# Patient Record
Sex: FEMALE | Race: WHITE | NOT HISPANIC OR LATINO | Employment: OTHER | URBAN - METROPOLITAN AREA
[De-identification: names, ages, dates, MRNs, and addresses within clinical notes are randomized per-mention and may not be internally consistent; named-entity substitution may affect disease eponyms.]

---

## 2017-06-26 ENCOUNTER — HISTORICAL (OUTPATIENT)
Dept: ADMINISTRATIVE | Facility: HOSPITAL | Age: 62
End: 2017-06-26

## 2017-06-26 LAB
ABS NEUT (OLG): 5.19 X10(3)/MCL (ref 2.1–9.2)
ALBUMIN SERPL-MCNC: 3.9 GM/DL (ref 3.4–5)
ALBUMIN/GLOB SERPL: 1.3 {RATIO}
ALP SERPL-CCNC: 63 UNIT/L (ref 38–126)
ALT SERPL-CCNC: 20 UNIT/L (ref 12–78)
AST SERPL-CCNC: 11 UNIT/L (ref 15–37)
BASOPHILS # BLD AUTO: 0 X10(3)/MCL (ref 0–0.2)
BASOPHILS NFR BLD AUTO: 0 %
BILIRUB SERPL-MCNC: 0.5 MG/DL (ref 0.2–1)
BILIRUBIN DIRECT+TOT PNL SERPL-MCNC: 0.1 MG/DL (ref 0–0.2)
BILIRUBIN DIRECT+TOT PNL SERPL-MCNC: 0.4 MG/DL (ref 0–0.8)
BUN SERPL-MCNC: 27 MG/DL (ref 7–18)
CALCIUM SERPL-MCNC: 8.7 MG/DL (ref 8.5–10.1)
CHLORIDE SERPL-SCNC: 105 MMOL/L (ref 98–107)
CO2 SERPL-SCNC: 28 MMOL/L (ref 21–32)
CREAT SERPL-MCNC: 1.06 MG/DL (ref 0.55–1.02)
CRP SERPL HS-MCNC: 1.31 MG/L (ref 0–3)
EOSINOPHIL # BLD AUTO: 0.1 X10(3)/MCL (ref 0–0.9)
EOSINOPHIL NFR BLD AUTO: 2 %
ERYTHROCYTE [DISTWIDTH] IN BLOOD BY AUTOMATED COUNT: 13.7 % (ref 11.5–17)
GLOBULIN SER-MCNC: 3 GM/DL (ref 2.4–3.5)
GLUCOSE SERPL-MCNC: 106 MG/DL (ref 74–106)
HCT VFR BLD AUTO: 34.4 % (ref 37–47)
HGB BLD-MCNC: 10.7 GM/DL (ref 12–16)
LYMPHOCYTES # BLD AUTO: 2.1 X10(3)/MCL (ref 0.6–4.6)
LYMPHOCYTES NFR BLD AUTO: 26 %
MCH RBC QN AUTO: 28.7 PG (ref 27–31)
MCHC RBC AUTO-ENTMCNC: 31.1 GM/DL (ref 33–36)
MCV RBC AUTO: 92.2 FL (ref 80–94)
MONOCYTES # BLD AUTO: 0.6 X10(3)/MCL (ref 0.1–1.3)
MONOCYTES NFR BLD AUTO: 7 %
NEUTROPHILS # BLD AUTO: 5.19 X10(3)/MCL (ref 1.4–7.9)
NEUTROPHILS NFR BLD AUTO: 65 %
PLATELET # BLD AUTO: 242 X10(3)/MCL (ref 130–400)
PMV BLD AUTO: 10.6 FL (ref 9.4–12.4)
POTASSIUM SERPL-SCNC: 4.1 MMOL/L (ref 3.5–5.1)
PROT SERPL-MCNC: 6.9 GM/DL (ref 6.4–8.2)
RBC # BLD AUTO: 3.73 X10(6)/MCL (ref 4.2–5.4)
SODIUM SERPL-SCNC: 141 MMOL/L (ref 136–145)
WBC # SPEC AUTO: 8 X10(3)/MCL (ref 4.5–11.5)

## 2017-06-27 ENCOUNTER — HISTORICAL (OUTPATIENT)
Dept: ADMINISTRATIVE | Facility: HOSPITAL | Age: 62
End: 2017-06-27

## 2017-07-06 ENCOUNTER — HISTORICAL (OUTPATIENT)
Dept: ADMINISTRATIVE | Facility: HOSPITAL | Age: 62
End: 2017-07-06

## 2018-06-25 ENCOUNTER — HISTORICAL (OUTPATIENT)
Dept: ADMINISTRATIVE | Facility: HOSPITAL | Age: 63
End: 2018-06-25

## 2018-06-25 ENCOUNTER — HISTORICAL (OUTPATIENT)
Dept: LAB | Facility: HOSPITAL | Age: 63
End: 2018-06-25

## 2018-06-25 ENCOUNTER — HISTORICAL (OUTPATIENT)
Dept: BARIATRICS | Facility: HOSPITAL | Age: 63
End: 2018-06-25

## 2018-06-25 LAB — H PYLORI AB SER IA-ACNC: NEGATIVE

## 2018-07-30 ENCOUNTER — HISTORICAL (OUTPATIENT)
Dept: BARIATRICS | Facility: HOSPITAL | Age: 63
End: 2018-07-30

## 2018-08-23 ENCOUNTER — HISTORICAL (OUTPATIENT)
Dept: BARIATRICS | Facility: HOSPITAL | Age: 63
End: 2018-08-23

## 2018-09-26 ENCOUNTER — HISTORICAL (OUTPATIENT)
Dept: BARIATRICS | Facility: HOSPITAL | Age: 63
End: 2018-09-26

## 2018-09-28 ENCOUNTER — HISTORICAL (OUTPATIENT)
Dept: SURGERY | Facility: HOSPITAL | Age: 63
End: 2018-09-28

## 2018-10-24 ENCOUNTER — HISTORICAL (OUTPATIENT)
Dept: BARIATRICS | Facility: HOSPITAL | Age: 63
End: 2018-10-24

## 2018-11-20 ENCOUNTER — HISTORICAL (OUTPATIENT)
Dept: BARIATRICS | Facility: HOSPITAL | Age: 63
End: 2018-11-20

## 2018-11-26 ENCOUNTER — HISTORICAL (OUTPATIENT)
Dept: PREADMISSION TESTING | Facility: HOSPITAL | Age: 63
End: 2018-11-26

## 2018-11-26 LAB
ABS NEUT (OLG): 5.87 X10(3)/MCL (ref 2.1–9.2)
ALBUMIN SERPL-MCNC: 3.8 GM/DL (ref 3.4–5)
ALBUMIN/GLOB SERPL: 1.2 {RATIO}
ALP SERPL-CCNC: 61 UNIT/L (ref 38–126)
ALT SERPL-CCNC: 21 UNIT/L (ref 12–78)
APTT PPP: 31.6 SECOND(S) (ref 24.8–36.9)
AST SERPL-CCNC: 11 UNIT/L (ref 15–37)
BASOPHILS # BLD AUTO: 0 X10(3)/MCL (ref 0–0.2)
BASOPHILS NFR BLD AUTO: 0 %
BILIRUB SERPL-MCNC: 0.8 MG/DL (ref 0.2–1)
BILIRUBIN DIRECT+TOT PNL SERPL-MCNC: 0.2 MG/DL (ref 0–0.2)
BILIRUBIN DIRECT+TOT PNL SERPL-MCNC: 0.6 MG/DL (ref 0–0.8)
BUN SERPL-MCNC: 27 MG/DL (ref 7–18)
CALCIUM SERPL-MCNC: 9 MG/DL (ref 8.5–10.1)
CHLORIDE SERPL-SCNC: 104 MMOL/L (ref 98–107)
CO2 SERPL-SCNC: 29 MMOL/L (ref 21–32)
CREAT SERPL-MCNC: 0.96 MG/DL (ref 0.55–1.02)
EOSINOPHIL # BLD AUTO: 0.2 X10(3)/MCL (ref 0–0.9)
EOSINOPHIL NFR BLD AUTO: 2 %
ERYTHROCYTE [DISTWIDTH] IN BLOOD BY AUTOMATED COUNT: 13.1 % (ref 11.5–17)
GLOBULIN SER-MCNC: 3.2 GM/DL (ref 2.4–3.5)
GLUCOSE SERPL-MCNC: 98 MG/DL (ref 74–106)
HCT VFR BLD AUTO: 37.7 % (ref 37–47)
HGB BLD-MCNC: 11.9 GM/DL (ref 12–16)
LYMPHOCYTES # BLD AUTO: 1.9 X10(3)/MCL (ref 0.6–4.6)
LYMPHOCYTES NFR BLD AUTO: 22 %
MCH RBC QN AUTO: 29.6 PG (ref 27–31)
MCHC RBC AUTO-ENTMCNC: 31.6 GM/DL (ref 33–36)
MCV RBC AUTO: 93.8 FL (ref 80–94)
MONOCYTES # BLD AUTO: 0.5 X10(3)/MCL (ref 0.1–1.3)
MONOCYTES NFR BLD AUTO: 6 %
NEUTROPHILS # BLD AUTO: 5.87 X10(3)/MCL (ref 2.1–9.2)
NEUTROPHILS NFR BLD AUTO: 69 %
PLATELET # BLD AUTO: 254 X10(3)/MCL (ref 130–400)
PMV BLD AUTO: 10.4 FL (ref 9.4–12.4)
POTASSIUM SERPL-SCNC: 4.5 MMOL/L (ref 3.5–5.1)
PROT SERPL-MCNC: 7 GM/DL (ref 6.4–8.2)
RBC # BLD AUTO: 4.02 X10(6)/MCL (ref 4.2–5.4)
SODIUM SERPL-SCNC: 140 MMOL/L (ref 136–145)
WBC # SPEC AUTO: 8.5 X10(3)/MCL (ref 4.5–11.5)

## 2018-11-30 ENCOUNTER — HISTORICAL (OUTPATIENT)
Dept: BARIATRICS | Facility: HOSPITAL | Age: 63
End: 2018-11-30

## 2018-12-14 ENCOUNTER — HISTORICAL (OUTPATIENT)
Dept: LAB | Facility: HOSPITAL | Age: 63
End: 2018-12-14

## 2018-12-26 ENCOUNTER — HISTORICAL (OUTPATIENT)
Dept: BARIATRICS | Facility: HOSPITAL | Age: 63
End: 2018-12-26

## 2019-02-11 ENCOUNTER — HISTORICAL (OUTPATIENT)
Dept: PREADMISSION TESTING | Facility: HOSPITAL | Age: 64
End: 2019-02-11

## 2019-02-11 LAB
ABS NEUT (OLG): 3.69 X10(3)/MCL (ref 2.1–9.2)
ALBUMIN SERPL-MCNC: 3.9 GM/DL (ref 3.4–5)
ALBUMIN/GLOB SERPL: 1.1 RATIO (ref 1.1–2)
ALP SERPL-CCNC: 55 UNIT/L (ref 38–126)
ALT SERPL-CCNC: 23 UNIT/L (ref 12–78)
AST SERPL-CCNC: 15 UNIT/L (ref 15–37)
BASOPHILS # BLD AUTO: 0 X10(3)/MCL (ref 0–0.2)
BASOPHILS NFR BLD AUTO: 0 %
BILIRUB SERPL-MCNC: 0.7 MG/DL (ref 0.2–1)
BILIRUBIN DIRECT+TOT PNL SERPL-MCNC: 0.2 MG/DL (ref 0–0.5)
BILIRUBIN DIRECT+TOT PNL SERPL-MCNC: 0.5 MG/DL (ref 0–0.8)
BUN SERPL-MCNC: 35 MG/DL (ref 7–18)
CALCIUM SERPL-MCNC: 9 MG/DL (ref 8.5–10.1)
CHLORIDE SERPL-SCNC: 104 MMOL/L (ref 98–107)
CO2 SERPL-SCNC: 27 MMOL/L (ref 21–32)
CREAT SERPL-MCNC: 1.13 MG/DL (ref 0.55–1.02)
EOSINOPHIL # BLD AUTO: 0.1 X10(3)/MCL (ref 0–0.9)
EOSINOPHIL NFR BLD AUTO: 2 %
ERYTHROCYTE [DISTWIDTH] IN BLOOD BY AUTOMATED COUNT: 15.6 % (ref 11.5–17)
EST. AVERAGE GLUCOSE BLD GHB EST-MCNC: 94 MG/DL
GLOBULIN SER-MCNC: 3.4 GM/DL (ref 2.4–3.5)
GLUCOSE SERPL-MCNC: 80 MG/DL (ref 74–106)
HBA1C MFR BLD: 4.9 % (ref 4.2–6.3)
HCT VFR BLD AUTO: 42.3 % (ref 37–47)
HGB BLD-MCNC: 13 GM/DL (ref 12–16)
IRON SATN MFR SERPL: 24.3 % (ref 20–50)
IRON SERPL-MCNC: 69 MCG/DL (ref 50–175)
LYMPHOCYTES # BLD AUTO: 1.9 X10(3)/MCL (ref 0.6–4.6)
LYMPHOCYTES NFR BLD AUTO: 30 %
MCH RBC QN AUTO: 29.1 PG (ref 27–31)
MCHC RBC AUTO-ENTMCNC: 30.7 GM/DL (ref 33–36)
MCV RBC AUTO: 94.6 FL (ref 80–94)
MONOCYTES # BLD AUTO: 0.5 X10(3)/MCL (ref 0.1–1.3)
MONOCYTES NFR BLD AUTO: 8 %
NEUTROPHILS # BLD AUTO: 3.69 X10(3)/MCL (ref 2.1–9.2)
NEUTROPHILS NFR BLD AUTO: 59 %
PLATELET # BLD AUTO: 190 X10(3)/MCL (ref 130–400)
PMV BLD AUTO: 11.4 FL (ref 9.4–12.4)
POTASSIUM SERPL-SCNC: 4.2 MMOL/L (ref 3.5–5.1)
PROT SERPL-MCNC: 7.3 GM/DL (ref 6.4–8.2)
RBC # BLD AUTO: 4.47 X10(6)/MCL (ref 4.2–5.4)
SODIUM SERPL-SCNC: 141 MMOL/L (ref 136–145)
TIBC SERPL-MCNC: 284 MCG/DL (ref 250–450)
TRANSFERRIN SERPL-MCNC: 198 MG/DL (ref 200–360)
VIT B12 SERPL-MCNC: 865 PG/ML (ref 193–986)
WBC # SPEC AUTO: 6.3 X10(3)/MCL (ref 4.5–11.5)

## 2019-02-18 ENCOUNTER — HISTORICAL (OUTPATIENT)
Dept: BARIATRICS | Facility: HOSPITAL | Age: 64
End: 2019-02-18

## 2019-04-15 ENCOUNTER — HISTORICAL (OUTPATIENT)
Dept: BARIATRICS | Facility: HOSPITAL | Age: 64
End: 2019-04-15

## 2019-05-29 ENCOUNTER — HISTORICAL (OUTPATIENT)
Dept: PREADMISSION TESTING | Facility: HOSPITAL | Age: 64
End: 2019-05-29

## 2019-05-29 LAB
ABS NEUT (OLG): 4.33 X10(3)/MCL (ref 2.1–9.2)
ALBUMIN SERPL-MCNC: 4.2 GM/DL (ref 3.4–5)
ALBUMIN/GLOB SERPL: 1.4 RATIO (ref 1.1–2)
ALP SERPL-CCNC: 50 UNIT/L (ref 38–126)
ALT SERPL-CCNC: 19 UNIT/L (ref 12–78)
AST SERPL-CCNC: 14 UNIT/L (ref 15–37)
BASOPHILS # BLD AUTO: 0 X10(3)/MCL (ref 0–0.2)
BASOPHILS NFR BLD AUTO: 0 %
BILIRUB SERPL-MCNC: 0.9 MG/DL (ref 0.2–1)
BILIRUBIN DIRECT+TOT PNL SERPL-MCNC: 0.3 MG/DL (ref 0–0.5)
BILIRUBIN DIRECT+TOT PNL SERPL-MCNC: 0.6 MG/DL (ref 0–0.8)
BUN SERPL-MCNC: 33 MG/DL (ref 7–18)
CALCIUM SERPL-MCNC: 9.7 MG/DL (ref 8.5–10.1)
CHLORIDE SERPL-SCNC: 104 MMOL/L (ref 98–107)
CO2 SERPL-SCNC: 27 MMOL/L (ref 21–32)
CREAT SERPL-MCNC: 1.25 MG/DL (ref 0.55–1.02)
EOSINOPHIL # BLD AUTO: 0.1 X10(3)/MCL (ref 0–0.9)
EOSINOPHIL NFR BLD AUTO: 2 %
ERYTHROCYTE [DISTWIDTH] IN BLOOD BY AUTOMATED COUNT: 13.6 % (ref 11.5–17)
EST. AVERAGE GLUCOSE BLD GHB EST-MCNC: 94 MG/DL
GLOBULIN SER-MCNC: 3 GM/DL (ref 2.4–3.5)
GLUCOSE SERPL-MCNC: 90 MG/DL (ref 74–106)
HBA1C MFR BLD: 4.9 % (ref 4.2–6.3)
HCT VFR BLD AUTO: 37.5 % (ref 37–47)
HGB BLD-MCNC: 12 GM/DL (ref 12–16)
IRON SATN MFR SERPL: 23.8 % (ref 20–50)
IRON SERPL-MCNC: 70 MCG/DL (ref 50–175)
LYMPHOCYTES # BLD AUTO: 2.1 X10(3)/MCL (ref 0.6–4.6)
LYMPHOCYTES NFR BLD AUTO: 30 %
MCH RBC QN AUTO: 30.8 PG (ref 27–31)
MCHC RBC AUTO-ENTMCNC: 32 GM/DL (ref 33–36)
MCV RBC AUTO: 96.4 FL (ref 80–94)
MONOCYTES # BLD AUTO: 0.5 X10(3)/MCL (ref 0.1–1.3)
MONOCYTES NFR BLD AUTO: 6 %
NEUTROPHILS # BLD AUTO: 4.33 X10(3)/MCL (ref 2.1–9.2)
NEUTROPHILS NFR BLD AUTO: 61 %
PLATELET # BLD AUTO: 212 X10(3)/MCL (ref 130–400)
PMV BLD AUTO: 11.8 FL (ref 9.4–12.4)
POTASSIUM SERPL-SCNC: 4.2 MMOL/L (ref 3.5–5.1)
PROT SERPL-MCNC: 7.2 GM/DL (ref 6.4–8.2)
RBC # BLD AUTO: 3.89 X10(6)/MCL (ref 4.2–5.4)
SODIUM SERPL-SCNC: 140 MMOL/L (ref 136–145)
TIBC SERPL-MCNC: 294 MCG/DL (ref 250–450)
TRANSFERRIN SERPL-MCNC: 228 MG/DL (ref 200–360)
VIT B12 SERPL-MCNC: 2051 PG/ML (ref 193–986)
WBC # SPEC AUTO: 7.1 X10(3)/MCL (ref 4.5–11.5)

## 2019-06-17 ENCOUNTER — HISTORICAL (OUTPATIENT)
Dept: BARIATRICS | Facility: HOSPITAL | Age: 64
End: 2019-06-17

## 2019-09-13 ENCOUNTER — HISTORICAL (OUTPATIENT)
Dept: BARIATRICS | Facility: HOSPITAL | Age: 64
End: 2019-09-13

## 2019-12-13 ENCOUNTER — HISTORICAL (OUTPATIENT)
Dept: PREADMISSION TESTING | Facility: HOSPITAL | Age: 64
End: 2019-12-13

## 2019-12-13 LAB
ABS NEUT (OLG): 4.74 X10(3)/MCL (ref 2.1–9.2)
ALBUMIN SERPL-MCNC: 3.8 GM/DL (ref 3.4–5)
ALBUMIN/GLOB SERPL: 1.2 RATIO (ref 1.1–2)
ALP SERPL-CCNC: 72 UNIT/L (ref 38–126)
ALT SERPL-CCNC: 34 UNIT/L (ref 12–78)
AST SERPL-CCNC: 19 UNIT/L (ref 15–37)
BASOPHILS # BLD AUTO: 0 X10(3)/MCL (ref 0–0.2)
BASOPHILS NFR BLD AUTO: 0 %
BILIRUB SERPL-MCNC: 1.4 MG/DL (ref 0.2–1)
BILIRUBIN DIRECT+TOT PNL SERPL-MCNC: 0.3 MG/DL (ref 0–0.5)
BILIRUBIN DIRECT+TOT PNL SERPL-MCNC: 1.1 MG/DL (ref 0–0.8)
BUN SERPL-MCNC: 24 MG/DL (ref 7–18)
CALCIUM SERPL-MCNC: 9.4 MG/DL (ref 8.5–10.1)
CHLORIDE SERPL-SCNC: 105 MMOL/L (ref 98–107)
CO2 SERPL-SCNC: 28 MMOL/L (ref 21–32)
CREAT SERPL-MCNC: 0.78 MG/DL (ref 0.55–1.02)
EOSINOPHIL # BLD AUTO: 0.1 X10(3)/MCL (ref 0–0.9)
EOSINOPHIL NFR BLD AUTO: 2 %
ERYTHROCYTE [DISTWIDTH] IN BLOOD BY AUTOMATED COUNT: 13.1 % (ref 11.5–17)
EST. AVERAGE GLUCOSE BLD GHB EST-MCNC: 120 MG/DL
GLOBULIN SER-MCNC: 3.2 GM/DL (ref 2.4–3.5)
GLUCOSE SERPL-MCNC: 80 MG/DL (ref 74–106)
HBA1C MFR BLD: 5.8 % (ref 4.2–6.3)
HCT VFR BLD AUTO: 42.3 % (ref 37–47)
HGB BLD-MCNC: 13.2 GM/DL (ref 12–16)
IRON SATN MFR SERPL: 31.8 % (ref 20–50)
IRON SERPL-MCNC: 100 MCG/DL (ref 50–175)
LYMPHOCYTES # BLD AUTO: 1.9 X10(3)/MCL (ref 0.6–4.6)
LYMPHOCYTES NFR BLD AUTO: 26 %
MCH RBC QN AUTO: 30.1 PG (ref 27–31)
MCHC RBC AUTO-ENTMCNC: 31.2 GM/DL (ref 33–36)
MCV RBC AUTO: 96.4 FL (ref 80–94)
MONOCYTES # BLD AUTO: 0.5 X10(3)/MCL (ref 0.1–1.3)
MONOCYTES NFR BLD AUTO: 7 %
NEUTROPHILS # BLD AUTO: 4.74 X10(3)/MCL (ref 2.1–9.2)
NEUTROPHILS NFR BLD AUTO: 64 %
PLATELET # BLD AUTO: 244 X10(3)/MCL (ref 130–400)
PMV BLD AUTO: 11.6 FL (ref 9.4–12.4)
POTASSIUM SERPL-SCNC: 4.6 MMOL/L (ref 3.5–5.1)
PROT SERPL-MCNC: 7 GM/DL (ref 6.4–8.2)
RBC # BLD AUTO: 4.39 X10(6)/MCL (ref 4.2–5.4)
SODIUM SERPL-SCNC: 139 MMOL/L (ref 136–145)
TIBC SERPL-MCNC: 314 MCG/DL (ref 250–450)
TRANSFERRIN SERPL-MCNC: 254 MG/DL (ref 200–360)
VIT B12 SERPL-MCNC: 743 PG/ML (ref 193–986)
WBC # SPEC AUTO: 7.4 X10(3)/MCL (ref 4.5–11.5)

## 2019-12-20 ENCOUNTER — HISTORICAL (OUTPATIENT)
Dept: BARIATRICS | Facility: HOSPITAL | Age: 64
End: 2019-12-20

## 2020-06-01 ENCOUNTER — HISTORICAL (OUTPATIENT)
Dept: PREADMISSION TESTING | Facility: HOSPITAL | Age: 65
End: 2020-06-01

## 2020-06-01 LAB
ABS NEUT (OLG): 3.62 X10(3)/MCL (ref 2.1–9.2)
ALBUMIN SERPL-MCNC: 4 GM/DL (ref 3.4–5)
ALBUMIN/GLOB SERPL: 1.3 RATIO (ref 1.1–2)
ALP SERPL-CCNC: 87 UNIT/L (ref 40–150)
ALT SERPL-CCNC: 21 UNIT/L (ref 0–55)
ANTINUCLEAR ANTIBODY SCREEN (OHS): NEGATIVE
APPEARANCE, UA: ABNORMAL
AST SERPL-CCNC: 23 UNIT/L (ref 5–34)
BACTERIA SPEC CULT: ABNORMAL /HPF
BASOPHILS # BLD AUTO: 0 X10(3)/MCL (ref 0–0.2)
BASOPHILS NFR BLD AUTO: 0 %
BILIRUB SERPL-MCNC: 0.8 MG/DL
BILIRUB UR QL STRIP: NEGATIVE
BILIRUBIN DIRECT+TOT PNL SERPL-MCNC: 0.3 MG/DL (ref 0–0.5)
BILIRUBIN DIRECT+TOT PNL SERPL-MCNC: 0.5 MG/DL (ref 0–0.8)
BUN SERPL-MCNC: 17.7 MG/DL (ref 9.8–20.1)
CALCIUM SERPL-MCNC: 9.3 MG/DL (ref 8.4–10.2)
CHLORIDE SERPL-SCNC: 103 MMOL/L (ref 98–107)
CO2 SERPL-SCNC: 31 MMOL/L (ref 23–31)
COLOR UR: YELLOW
CREAT SERPL-MCNC: 0.89 MG/DL (ref 0.55–1.02)
DSDNA ANTIBODY (OHS): NEGATIVE
EOSINOPHIL # BLD AUTO: 0.3 X10(3)/MCL (ref 0–0.9)
EOSINOPHIL NFR BLD AUTO: 5 %
ERYTHROCYTE [DISTWIDTH] IN BLOOD BY AUTOMATED COUNT: 12.9 % (ref 11.5–17)
GLOBULIN SER-MCNC: 3 GM/DL (ref 2.4–3.5)
GLUCOSE (UA): NEGATIVE
GLUCOSE SERPL-MCNC: 101 MG/DL (ref 82–115)
HCT VFR BLD AUTO: 43.3 % (ref 37–47)
HCV AB SERPL QL IA: NONREACTIVE
HGB BLD-MCNC: 13.9 GM/DL (ref 12–16)
HGB UR QL STRIP: ABNORMAL
HIV 1+2 AB+HIV1 P24 AG SERPL QL IA: NONREACTIVE
IRON SATN MFR SERPL: 29 % (ref 20–50)
IRON SERPL-MCNC: 86 UG/DL (ref 50–170)
KETONES UR QL STRIP: NEGATIVE
LEUKOCYTE ESTERASE UR QL STRIP: ABNORMAL
LYMPHOCYTES # BLD AUTO: 1.9 X10(3)/MCL (ref 0.6–4.6)
LYMPHOCYTES NFR BLD AUTO: 30 %
MCH RBC QN AUTO: 30.2 PG (ref 27–31)
MCHC RBC AUTO-ENTMCNC: 32.1 GM/DL (ref 33–36)
MCV RBC AUTO: 93.9 FL (ref 80–94)
MONOCYTES # BLD AUTO: 0.4 X10(3)/MCL (ref 0.1–1.3)
MONOCYTES NFR BLD AUTO: 7 %
NEUTROPHILS # BLD AUTO: 3.62 X10(3)/MCL (ref 2.1–9.2)
NEUTROPHILS NFR BLD AUTO: 57 %
NITRITE UR QL STRIP: NEGATIVE
PH UR STRIP: 5 [PH] (ref 5–9)
PLATELET # BLD AUTO: 252 X10(3)/MCL (ref 130–400)
PMV BLD AUTO: 10.9 FL (ref 9.4–12.4)
POTASSIUM SERPL-SCNC: 4.6 MMOL/L (ref 3.5–5.1)
PROT SERPL-MCNC: 7 GM/DL
PROT SERPL-MCNC: 7 GM/DL (ref 5.8–7.6)
PROT UR QL STRIP: NEGATIVE
RBC # BLD AUTO: 4.61 X10(6)/MCL (ref 4.2–5.4)
RBC #/AREA URNS HPF: ABNORMAL /[HPF]
SODIUM SERPL-SCNC: 143 MMOL/L (ref 136–145)
SP GR UR STRIP: 1.02 (ref 1–1.03)
SQUAMOUS EPITHELIAL, UA: ABNORMAL
TIBC SERPL-MCNC: 210 UG/DL (ref 70–310)
TIBC SERPL-MCNC: 296 UG/DL (ref 250–450)
TRANSFERRIN SERPL-MCNC: 251 MG/DL (ref 173–360)
TSH SERPL-ACNC: 1.43 UIU/ML (ref 0.35–4.94)
UROBILINOGEN UR STRIP-ACNC: 0.2
VIT B12 SERPL-MCNC: 1135 PG/ML (ref 213–816)
WBC # SPEC AUTO: 6.4 X10(3)/MCL (ref 4.5–11.5)
WBC #/AREA URNS HPF: 13 /HPF (ref 0–3)

## 2020-06-11 LAB
ALBUMIN SERPL-MCNC: 4.4 GM/DL (ref 3.4–4.8)
ALBUMIN/GLOB SERPL: 1.5 RATIO (ref 1.1–2)
ALP SERPL-CCNC: 83 UNIT/L (ref 40–150)
ALT SERPL-CCNC: 19 UNIT/L (ref 0–55)
AST SERPL-CCNC: 24 UNIT/L (ref 5–34)
BILIRUB SERPL-MCNC: 1.5 MG/DL
BILIRUBIN DIRECT+TOT PNL SERPL-MCNC: 0.5 MG/DL (ref 0–0.5)
BILIRUBIN DIRECT+TOT PNL SERPL-MCNC: 1 MG/DL (ref 0–0.8)
BUN SERPL-MCNC: 28 MG/DL (ref 9.8–20.1)
CALCIUM SERPL-MCNC: 9.5 MG/DL (ref 8.4–10.2)
CHLORIDE SERPL-SCNC: 103 MMOL/L (ref 98–107)
CHOLEST SERPL-MCNC: 223 MG/DL
CHOLEST/HDLC SERPL: 2 {RATIO} (ref 0–5)
CO2 SERPL-SCNC: 28 MMOL/L (ref 23–31)
CREAT SERPL-MCNC: 0.84 MG/DL (ref 0.55–1.02)
ERYTHROCYTE [DISTWIDTH] IN BLOOD BY AUTOMATED COUNT: 12.5 % (ref 11.5–17)
EST. AVERAGE GLUCOSE BLD GHB EST-MCNC: 114 MG/DL
GLOBULIN SER-MCNC: 3 GM/DL (ref 2.4–3.5)
GLUCOSE SERPL-MCNC: 106 MG/DL (ref 82–115)
HBA1C MFR BLD: 5.6 %
HCT VFR BLD AUTO: 41.8 % (ref 37–47)
HDLC SERPL-MCNC: 91 MG/DL (ref 35–60)
HGB BLD-MCNC: 13.7 GM/DL (ref 12–16)
INR PPP: 1 (ref 0–1.3)
LDLC SERPL CALC-MCNC: 120 MG/DL (ref 50–140)
MAGNESIUM SERPL-MCNC: 1.88 MG/DL (ref 1.6–2.6)
MCH RBC QN AUTO: 30.2 PG (ref 27–31)
MCHC RBC AUTO-ENTMCNC: 32.8 GM/DL (ref 33–36)
MCV RBC AUTO: 92.1 FL (ref 80–94)
PLATELET # BLD AUTO: 247 X10(3)/MCL (ref 130–400)
PMV BLD AUTO: 10.3 FL (ref 9.4–12.4)
POTASSIUM SERPL-SCNC: 4.2 MMOL/L (ref 3.5–5.1)
PROT SERPL-MCNC: 7.4 GM/DL (ref 5.8–7.6)
PROTHROMBIN TIME: 12.7 SECOND(S) (ref 11.1–13.7)
RBC # BLD AUTO: 4.54 X10(6)/MCL (ref 4.2–5.4)
SODIUM SERPL-SCNC: 140 MMOL/L (ref 136–145)
TRIGL SERPL-MCNC: 59 MG/DL (ref 37–140)
TSH SERPL-ACNC: 0.69 UIU/ML (ref 0.35–4.94)
VLDLC SERPL CALC-MCNC: 12 MG/DL
WBC # SPEC AUTO: 6.6 X10(3)/MCL (ref 4.5–11.5)

## 2020-06-12 ENCOUNTER — HISTORICAL (OUTPATIENT)
Dept: ADMINISTRATIVE | Facility: HOSPITAL | Age: 65
End: 2020-06-12

## 2020-09-21 ENCOUNTER — HISTORICAL (OUTPATIENT)
Dept: ADMINISTRATIVE | Facility: HOSPITAL | Age: 65
End: 2020-09-21

## 2020-09-21 LAB
BUN SERPL-MCNC: 28.2 MG/DL (ref 9.8–20.1)
CALCIUM SERPL-MCNC: 8.7 MG/DL (ref 8.4–10.2)
CHLORIDE SERPL-SCNC: 104 MMOL/L (ref 98–107)
CHOLEST SERPL-MCNC: 177 MG/DL
CHOLEST/HDLC SERPL: 3 {RATIO} (ref 0–5)
CO2 SERPL-SCNC: 30 MMOL/L (ref 23–31)
CREAT SERPL-MCNC: 0.86 MG/DL (ref 0.55–1.02)
CREAT/UREA NIT SERPL: 33
GLUCOSE SERPL-MCNC: 104 MG/DL (ref 82–115)
HDLC SERPL-MCNC: 70 MG/DL (ref 35–60)
LDLC SERPL CALC-MCNC: 93 MG/DL (ref 50–140)
MAGNESIUM SERPL-MCNC: 1.9 MG/DL (ref 1.6–2.6)
POTASSIUM SERPL-SCNC: 4.6 MMOL/L (ref 3.5–5.1)
SODIUM SERPL-SCNC: 139 MMOL/L (ref 136–145)
TRIGL SERPL-MCNC: 69 MG/DL (ref 37–140)
URATE SERPL-MCNC: 5.1 MG/DL (ref 2.7–6)
VLDLC SERPL CALC-MCNC: 14 MG/DL

## 2020-10-06 ENCOUNTER — HISTORICAL (OUTPATIENT)
Dept: ADMINISTRATIVE | Facility: HOSPITAL | Age: 65
End: 2020-10-06

## 2020-10-06 LAB
BUN SERPL-MCNC: 26.1 MG/DL (ref 9.8–20.1)
CALCIUM SERPL-MCNC: 9.1 MG/DL (ref 8.4–10.2)
CHLORIDE SERPL-SCNC: 105 MMOL/L (ref 98–107)
CO2 SERPL-SCNC: 29 MMOL/L (ref 23–31)
CREAT SERPL-MCNC: 0.94 MG/DL (ref 0.55–1.02)
CREAT/UREA NIT SERPL: 28
GLUCOSE SERPL-MCNC: 124 MG/DL (ref 82–115)
POTASSIUM SERPL-SCNC: 5.1 MMOL/L (ref 3.5–5.1)
SODIUM SERPL-SCNC: 142 MMOL/L (ref 136–145)

## 2021-03-04 ENCOUNTER — HOSPITAL ENCOUNTER (OUTPATIENT)
Dept: MEDSURG UNIT | Facility: HOSPITAL | Age: 66
End: 2021-03-05
Attending: INTERNAL MEDICINE | Admitting: INTERNAL MEDICINE

## 2021-03-04 LAB
ABS NEUT (OLG): 4.3 X10(3)/MCL (ref 2.1–9.2)
ALBUMIN SERPL-MCNC: 3.8 GM/DL (ref 3.4–4.8)
ALBUMIN/GLOB SERPL: 1.1 RATIO (ref 1.1–2)
ALP SERPL-CCNC: 110 UNIT/L (ref 40–150)
ALT SERPL-CCNC: 19 UNIT/L (ref 0–55)
AST SERPL-CCNC: 24 UNIT/L (ref 5–34)
BASOPHILS # BLD AUTO: 0 X10(3)/MCL (ref 0–0.2)
BASOPHILS NFR BLD AUTO: 0 %
BILIRUB SERPL-MCNC: 0.5 MG/DL
BILIRUBIN DIRECT+TOT PNL SERPL-MCNC: 0.1 MG/DL (ref 0–0.5)
BILIRUBIN DIRECT+TOT PNL SERPL-MCNC: 0.4 MG/DL (ref 0–0.8)
BUN SERPL-MCNC: 22.6 MG/DL (ref 9.8–20.1)
CALCIUM SERPL-MCNC: 8.7 MG/DL (ref 8.4–10.2)
CHLORIDE SERPL-SCNC: 108 MMOL/L (ref 98–107)
CK MB SERPL-MCNC: 0.8 NG/ML
CK MB SERPL-MCNC: 0.8 NG/ML
CK SERPL-CCNC: 61 U/L (ref 29–168)
CK SERPL-CCNC: 63 U/L (ref 29–168)
CO2 SERPL-SCNC: 25 MMOL/L (ref 23–31)
CREAT SERPL-MCNC: 0.86 MG/DL (ref 0.55–1.02)
EOSINOPHIL # BLD AUTO: 0.2 X10(3)/MCL (ref 0–0.9)
EOSINOPHIL NFR BLD AUTO: 3 %
ERYTHROCYTE [DISTWIDTH] IN BLOOD BY AUTOMATED COUNT: 12.9 % (ref 11.5–17)
GLOBULIN SER-MCNC: 3.5 GM/DL (ref 2.4–3.5)
GLUCOSE SERPL-MCNC: 113 MG/DL (ref 82–115)
HCT VFR BLD AUTO: 42.9 % (ref 37–47)
HGB BLD-MCNC: 13.8 GM/DL (ref 12–16)
LIPASE SERPL-CCNC: 35 U/L
LYMPHOCYTES # BLD AUTO: 2.4 X10(3)/MCL (ref 0.6–4.6)
LYMPHOCYTES NFR BLD AUTO: 31 %
MCH RBC QN AUTO: 30.2 PG (ref 27–31)
MCHC RBC AUTO-ENTMCNC: 32.2 GM/DL (ref 33–36)
MCV RBC AUTO: 93.9 FL (ref 80–94)
MONOCYTES # BLD AUTO: 0.6 X10(3)/MCL (ref 0.1–1.3)
MONOCYTES NFR BLD AUTO: 8 %
NEUTROPHILS # BLD AUTO: 4.3 X10(3)/MCL (ref 2.1–9.2)
NEUTROPHILS NFR BLD AUTO: 56 %
PLATELET # BLD AUTO: 264 X10(3)/MCL (ref 130–400)
PMV BLD AUTO: 10.7 FL (ref 9.4–12.4)
POTASSIUM SERPL-SCNC: 4.6 MMOL/L (ref 3.5–5.1)
PROT SERPL-MCNC: 7.3 GM/DL (ref 5.8–7.6)
RBC # BLD AUTO: 4.57 X10(6)/MCL (ref 4.2–5.4)
SODIUM SERPL-SCNC: 142 MMOL/L (ref 136–145)
TROPONIN I SERPL-MCNC: <0.01 NG/ML (ref 0–0.04)
WBC # SPEC AUTO: 7.6 X10(3)/MCL (ref 4.5–11.5)

## 2021-03-31 LAB
ABS NEUT (OLG): 4.05 X10(3)/MCL (ref 2.1–9.2)
ALBUMIN SERPL-MCNC: 4 GM/DL (ref 3.4–4.8)
ALBUMIN/GLOB SERPL: 1.4 RATIO (ref 1.1–2)
ALP SERPL-CCNC: 105 UNIT/L (ref 40–150)
ALT SERPL-CCNC: 15 UNIT/L (ref 0–55)
AST SERPL-CCNC: 17 UNIT/L (ref 5–34)
BASOPHILS # BLD AUTO: 0 X10(3)/MCL (ref 0–0.2)
BASOPHILS NFR BLD AUTO: 0 %
BILIRUB SERPL-MCNC: 0.6 MG/DL
BILIRUBIN DIRECT+TOT PNL SERPL-MCNC: 0.3 MG/DL (ref 0–0.5)
BILIRUBIN DIRECT+TOT PNL SERPL-MCNC: 0.3 MG/DL (ref 0–0.8)
BUN SERPL-MCNC: 23.5 MG/DL (ref 9.8–20.1)
CALCIUM SERPL-MCNC: 9.4 MG/DL (ref 8.4–10.2)
CHLORIDE SERPL-SCNC: 105 MMOL/L (ref 98–107)
CO2 SERPL-SCNC: 27 MMOL/L (ref 23–31)
CREAT SERPL-MCNC: 0.97 MG/DL (ref 0.55–1.02)
EOSINOPHIL # BLD AUTO: 0.2 X10(3)/MCL (ref 0–0.9)
EOSINOPHIL NFR BLD AUTO: 3 %
ERYTHROCYTE [DISTWIDTH] IN BLOOD BY AUTOMATED COUNT: 12.9 % (ref 11.5–17)
GLOBULIN SER-MCNC: 2.9 GM/DL (ref 2.4–3.5)
GLUCOSE SERPL-MCNC: 117 MG/DL (ref 82–115)
HCT VFR BLD AUTO: 41.7 % (ref 37–47)
HGB BLD-MCNC: 13.5 GM/DL (ref 12–16)
LYMPHOCYTES # BLD AUTO: 1.7 X10(3)/MCL (ref 0.6–4.6)
LYMPHOCYTES NFR BLD AUTO: 27 %
MCH RBC QN AUTO: 30.2 PG (ref 27–31)
MCHC RBC AUTO-ENTMCNC: 32.4 GM/DL (ref 33–36)
MCV RBC AUTO: 93.3 FL (ref 80–94)
MONOCYTES # BLD AUTO: 0.5 X10(3)/MCL (ref 0.1–1.3)
MONOCYTES NFR BLD AUTO: 8 %
NEUTROPHILS # BLD AUTO: 4.05 X10(3)/MCL (ref 2.1–9.2)
NEUTROPHILS NFR BLD AUTO: 62 %
PLATELET # BLD AUTO: 243 X10(3)/MCL (ref 130–400)
PMV BLD AUTO: 10.4 FL (ref 9.4–12.4)
POTASSIUM SERPL-SCNC: 4.4 MMOL/L (ref 3.5–5.1)
PROT SERPL-MCNC: 6.9 GM/DL (ref 5.8–7.6)
RBC # BLD AUTO: 4.47 X10(6)/MCL (ref 4.2–5.4)
SARS-COV-2 RNA RESP QL NAA+PROBE: NOT DETECTED
SODIUM SERPL-SCNC: 142 MMOL/L (ref 136–145)
WBC # SPEC AUTO: 6.5 X10(3)/MCL (ref 4.5–11.5)

## 2021-04-05 ENCOUNTER — HISTORICAL (OUTPATIENT)
Dept: ADMINISTRATIVE | Facility: HOSPITAL | Age: 66
End: 2021-04-05

## 2022-04-30 NOTE — ED PROVIDER NOTES
"   Patient:   Monique Acevedo            MRN: [id number]            FIN: [id number]               Age:   65 years     Sex:  Female     :  1955   Associated Diagnoses:   Chest pain   Author:   Jaycob Becker      Basic Information   Time seen: Date & time 3/4/2021 04:36:00.   History source: Patient, family.   Arrival mode: Private vehicle.   History limitation: None.   Additional information: Patient's physician(s): Link Macario DO      History of Present Illness   The patient presents with     66 y/o CF with a history of CAD, HTN, HLD, thyroid disease and pacemaker in place, presents to the ED for complaints of left chest pain radiating to the left arm, onset of 2100 last night while at rest. Pt says the pain began worsening, had some associated SOB, and was initially unrelieved after one NTG, however, she had mild relief after a second NTG 5 minutes later. She reports that had a negative angiogram in 2020, and had similar chest pain at that time, but did not have any cardiac stents placed. Pt also says she underwent an anterior cervical fusion 4 months ago. .  The onset was 3/3/2021 21:00:00 .  The course/duration of symptoms is constant, improving and fluctuating in intensity.  Location: Left chest. Radiating pain: left arm. The character of symptoms is "Pain".  The degree at onset was minimal.  The degree at maximum was moderate.  The degree at present is minimal.  The exacerbating factor is none.  The relieving factor is nitroglycerin.  Risk factors consist of coronary artery disease, hypertension and hyperlipidemia.  Prior episodes: angina, cardiac and coronary artery disease.  Therapy today Nitroglycerin.  Associated symptoms: shortness of breath.        Review of Systems   Constitutional symptoms:  Negative except as documented in HPI.   Skin symptoms:  Negative except as documented in HPI.   Eye symptoms:  Negative except as documented in HPI.   ENMT symptoms:  Negative " except as documented in HPI.   Respiratory symptoms:  Shortness of breath.   Cardiovascular symptoms:  Chest pain, left chest, radiating to the left arm.    Gastrointestinal symptoms:  Negative except as documented in HPI.   Musculoskeletal symptoms:  Negative except as documented in HPI.   Neurologic symptoms:  Negative except as documented in HPI.   Psychiatric symptoms:  Negative except as documented in HPI.             Additional review of systems information: All other systems reviewed and otherwise negative.      Health Status   Allergies:    Allergic Reactions (Selected)  Severity Not Documented  Asacol- Rash, sob and ..  Benicar- Weakness.  Diovan- Itching, rash.  Erythromycin- Gi upset.  Hismanal- Unknown.  Motrin- Gi upset.  Pamelor- Depression.  Plendil- Unknown.  Saphris- Unknown.  Vioxx- Gi upset.  Zetia- Weakness..   Medications:  (Selected)   Inpatient Medications  Ordered  nitroglycerin 2% transdermal ointment: 1 in, form: Ointment, TOP, [id number] (6-12-6-12), first dose 21 4:50:00 CST  Pending Complete  midazolam: 2 mg, form: Injection, IV Push, q5min, Order duration: 2 dose(s), first dose 20 8:15:00 CDT, stop date 20 8:24:00 CDT, (up to 5 mg for moderate anxiety)  Prescriptions  Prescribed  Protonix 40 mg ORAL enteric coated tablet: 40 mg = 1 tab(s), Oral, Daily, # 30 tab(s), 5 Refill(s), other reason (Rx)  Documented Medications  Documented  Advair Diskus 100 mcg-50 mcg inhalation powder: 2 inh, INH, BID, # 28 EA, 0 Refill(s)  Alpha Lipoic 300 mg oral tablet: 300 mg = 1 tab(s), Oral, Daily, # 30 tab(s), 0 Refill(s)  Alpha Lipoic 300 mg oral tablet: 300 mg = 1 tab(s), Oral, Daily, 0 Refill(s)  B-50 Complex oral capsule: 1 tab, Oral, Daily, 0 Refill(s)  CITALOPRAM   TAB 20M mg = 1 tab(s), Oral, qPM  CLORAZ DIPOT TAB 15MG: 3.75 mg = 0.25 tab(s), Oral, qPM  Centrum Adults: 1, Oral, BID, 0 Refill(s)  Omega Essentials: = 1 cap(s), Oral, BID, 0 Refill(s)  Os-Frantz Calcium+D3: 2  tab(s), Oral, BID, 0 Refill(s)  Pravachol: Oral, Daily, 0 Refill(s)  Prevalite Packets: 1 PACK, BID, PRN PRN diarrhea, 0 Refill(s)  ProAir HFA 90 mcg/inh inhalation aerosol with adapter: 2 puff(s), INH, q4hr, PRN PRN for wheezing, 0 Refill(s)  ROPINIROLE   TAB 1M mg = 1 tab(s), Oral, qPM  Ranexa 500mg Tab extended release: 500 mg = 1 tab(s), Oral, BID  Template Non-Formulary Med: 1 tab(s), Oral, qWeek, 0 Refill(s)  Template Non-Formulary Med: 2 tab(s), Oral, 0 Refill(s)  Template Non-Formulary Med: Probiotic (DIgestive Advantage) 1 every am, 0 Refill(s)  Vitamin B12 2500 mcg sublingual tablet: 2,500 mcg = 1 tab(s), SL, Daily, # 90 tab(s), 0 Refill(s)  Vitamin D3 50,000 intl units (1250 mcg) oral capsule: 50,000 IntUnit = 1 cap(s), Oral, qWeek, # 12 cap(s), 0 Refill(s)  aspirin 81 mg oral tablet: 81 mg = 1 tab(s), Oral, Daily, # 30 tab(s), 0 Refill(s)  atropine-diphenoxylate 0.025 mg-2.5 mg oral tablet: 1 tab(s), Oral, QID, PRN PRN for loose stools, 0 Refill(s)  azithromycin 250 mg oral tablet: 250 mg = 1 tab(s), Oral, Daily, # 4 tab(s), 0 Refill(s)  busPIRone 5 mg oral tablet: 0.5 mg, Oral, TID, 0 Refill(s)  citalopram 40 mg oral tablet: 40 mg = 1 tab(s), Oral, At Bedtime, 0 Refill(s)  clorazepate 7.5 mg oral tablet: 7.5 mg = 1 tab(s), Oral, At Bedtime, 0 Refill(s)  dicyclomine 10 mg oral tablet: 1 tab, Oral, QID, PRN PRN cramps, 0 Refill(s)  dicyclomine: = 1 tab(s), Oral, QID, PRN PRN cramps, 0 Refill(s)  diphenoxylate: = 1 tab(s), Oral, q4hr, PRN PRN diarrhea, 0 Refill(s)  levothyroxine 88 mcg (0.088 mg) oral tablet: 88 mcg = 1 tab(s), Oral, Daily  nitroglycerin 0.4 mg sublingual TAB: 0.4 mg = 1 tab(s), SL, q5min, PRN PRN as needed for chest pain, # 100 tab(s), 0 Refill(s)  nitroglycerin 0.4 mg sublingual TAB: 0.4 mg = 1 tab(s), SL, q5min, PRN PRN for chest pain, 0 Refill(s)  pravastatin 40 mg oral tablet: 40 mg = 1 tab(s), Oral, Daily  triamcinolone 0.1% topical cream: 1 darling, TOP, TID  valsartan 40 mg oral  tablet: 40 mg = 1 tab(s), Oral, Daily, # 60 tab(s), 0 Refill(s)  valsartan 40 mg oral tablet: 40 mg = 1 tab(s), Oral, Daily, 0 Refill(s).      Past Medical/ Family/ Social History   Medical history:    Resolved  Sleep apnea (027791878):  Resolved.  HTN - Hypertension (6843522758):  Resolved.  Major depression (0123708469):  Resolved.  Anxiety disorder (491726922):  Resolved.  Migraine (36282459):  Resolved.  Gastroparesis (508177220):  Resolved.  Ischemic colitis (98837169):  Resolved.  Bradycardia (66916966):  Resolved.  Angina (991178206):  Resolved.,   CAD.   Surgical history:    Transesophageal Echo (for Surgery) (., None) on 6/12/2020 at 64 Years.  Comments:  6/12/2020 10:41 ROSANNE Mccabe RN, Sheri ELLISON  auto-populated from documented surgical case  Biopsy Gastrointestinal on 1/27/2020 at 64 Years.  Comments:  1/27/2020 11:57 Mahendra Mattson RN  auto-populated from documented surgical case  Esophagogastroduodenoscopy on 1/27/2020 at 64 Years.  Comments:  1/27/2020 11:57 Mahendra Mattson RN  auto-populated from documented surgical case  Gastric Sleeve Laparoscopic (None) on 12/14/2018 at 63 Years.  Comments:  12/14/2018 9:06 Margie Harris RN  auto-populated from documented surgical case  Lap Hiatal Hernia on 12/14/2018 at 63 Years.  Comments:  12/14/2018 9:06 Margie Harris RN  auto-populated from documented surgical case  Esophagogastroduodenoscopy on 9/28/2018 at 63 Years.  Comments:  9/28/2018 11:21 Kacie Rodriguez RN  auto-populated from documented surgical case  hysterectomy.  adenoidectomy.  appendectomy.  shoulder arthroscopic.  cervical laminectomy.  tonsillectomy.  removal of salivary gland.  nasal septoplasty.  ORIF.  right knee replacement.  colonoscopy.  pacemakr..   Family history:    Father  Esophageal cancer  Skin cancer......  Heart disease.....  Hypertension.  Mother  Heart disease.....  Hypertension.  Diabetes mellitus type 2  .   Social history:    Social  & Psychosocial Habits    Alcohol  09/21/2018  Use: Current    Frequency: 1-2 times per year    Substance Use    Comment: denies - 01/24/2020 18:44 - Nikolay CORTEZ, Rachel RIVERA    Tobacco  06/12/2020  Use: Never (less than 100 in l    Patient Wants Consult For Cessation Counseling N/A    Abuse/Neglect  06/12/2020  SHX Any signs of abuse or neglect No  .      Physical Examination               Vital Signs   Vital Signs   3/4/2021 4:04 CST        Temperature Oral          36.6 DegC                             Temperature Oral (calculated)             97.88 DegF                             Peripheral Pulse Rate     71 bpm                             Respiratory Rate          21 br/min                             SpO2                      99 %                             Systolic Blood Pressure   127 mmHg                             Diastolic Blood Pressure  78 mmHg  .   Measurements   3/4/2021 4:04 CST        Weight Dosing             104.5 kg                             Weight Measured and Calculated in Lbs     230.38 lb                             Weight Measured and Calculated in Lbs     230.38 lb                             Weight Estimated          104.5 kg                             Height/Length Dosing      165 cm                             Height/Length Estimated   165 cm                             Body Mass Index Estimated 38.38 kg/m2  .   Basic Oxygen Information   3/4/2021 4:04 CST        SpO2                      99 %  .   General:  Alert, no acute distress.    Skin:  Warm, dry, intact.    Head:  Normocephalic, atraumatic.    Neck:  Supple, trachea midline.    Eye:  Pupils are equal, round and reactive to light, extraocular movements are intact, normal conjunctiva.    Ears, nose, mouth and throat:  Oral mucosa moist.   Cardiovascular:  Regular rate and rhythm, No murmur, Normal peripheral perfusion, No edema, Arterial pulses: Bilateral, radial, 2+, equal.    Respiratory:  Lungs are clear to auscultation,  respirations are non-labored, breath sounds are equal, Symmetrical chest wall expansion.    Chest wall:  No tenderness.   Musculoskeletal:  Normal ROM, normal strength, no tenderness, no swelling, no deformity.    Gastrointestinal:  Soft, Nontender, Non distended.    Neurological:  Alert and oriented to person, place, time, and situation, No focal neurological deficit observed, CN II-XII intact, normal sensory observed, normal motor observed, normal speech observed, normal coordination observed.    Psychiatric:  Cooperative, appropriate mood & affect, normal judgment.       Medical Decision Making   Documents reviewed:  Emergency department nurses' notes.   Orders  Launch Order Profile (Selected)   Inpatient Orders  Ordered  Cardiac Monitorin21 4:49:00 CST, Constant Order  nitroglycerin 2% transdermal ointment: 1 in, form: Ointment, TOP, [id number]-[id number] (6-612), first dose 21 4:50:00 CST  Ordered (Dispatched)  CBC w/ Auto Diff: Stat collect, 21 4:49:00 CST, Blood, Stop date 21 4:50:00 CST, Lab Collect, 21 4:49:00 CST  CMP: Stat collect, 21 4:49:00 CST, Blood, Stop date 21 4:50:00 CST, Lab Collect, 21 4:49:00 CST  Lipase Level: Stat collect, 21 4:49:00 CST, Blood, Stop date 21 4:50:00 CST, Lab Collect, 21 4:49:00 CST  Troponin-I: Stat collect, 21 4:49:00 CST, Blood, Stop date 21 4:50:00 CST, Lab Collect, 21 4:49:00 CST  Ordered (Exam Ordered)  CXR 1 View: Stat, 21 4:49:00 CST, Chest Pain, None, Stretcher, Patient Has IV?, Rad Type, Not Scheduled, 21 4:49:00 CST.   Electrocardiogram:  Time 3/4/2021 04:08:00, rate 70, EP Interp, The Rhythm is paced.  , No ST elevations.       Impression and Plan   Diagnosis   Chest pain (DBH83-PP R07.9)   Plan   Condition: Stable.    Disposition: Admit.    Counseled: Patient, Family, Regarding diagnosis, Regarding diagnostic results, Regarding treatment plan, Patient indicated  understanding of instructions.    Notes: I, Jaycob Becker, acted solely as a scribe for and in the presence of Dr. Jacob who performed the service. .       Addendum      Teaching-Supervisory Addendum-Brief   Notes: I, Dr. Jacob, personally performed the services described in this documentation as scribed in my presence and it is both accurate and complete..

## 2022-04-30 NOTE — OP NOTE
Patient:   Monique Acevedo            MRN: [id number]            FIN: [id number]               Age:   64 years     Sex:  Female     :  1955   Associated Diagnoses:   VHD (valvular heart disease); CAD - Coronary artery disease; Presence of permanent cardiac pacemaker; Sick sinus syndrome; SOB - Shortness of breath   Author:   Link Macario DO      Brief Operative Note   Operative Information   Date/ Time:  2020 10:44:00.     Preoperative Diagnosis: VHD (valvular heart disease) (UYO11-KZ I38).     Postoperative Diagnosis: CAD - Coronary artery disease (YCI58-ND I25.10), Presence of permanent cardiac pacemaker (EJJ24-PF Z95.0), Sick sinus syndrome (QSW69-ES I49.5), SOB - Shortness of breath (TVH99-EL R06.00), VHD (valvular heart disease) (ABT56-FT I38).     Procedures Performed: KARLENE   .     Surgeon: Link Macario DO     Esimated blood loss: No blood loss.     Description of Procedure/Findings/    Complications: See op report on chart.   .

## 2022-04-30 NOTE — PROGRESS NOTES
[facility]  Weight Loss Surgery Department  [address]  [place], [place]  [phone] Office  [phone] Fax    PreOp: Medically Supervised Weight Loss Counseling    Name: Monique Acevedo : 55   MRN: [id number]   Date: 18  Visit #:      Todays Wt: 293#  Previous Wt: 292#   Initial Wt: 301#        Notes from Dietitian/Nutritionist Visit:  Pt reports increased amount of fast food eating secondary to caring for ill aunt who is in the ICU.  Pt reports low appetite with some nausea that she attributes to anxiety and nervousness while in hospital setting.  Pt relying on coffee shop blended drinks and croissants, fried chicken, French fries and sub sandwiches.  Pt was advised to reduce restaurant eating by pre-planning and packing acceptable food items.  Pt was advised to begin testing protein shakes in order to prepare for surgery and to rely on these when appetite is low and pt is not wanting to eat.  Pt was also given a dining out guide to utilize when she must dine out.  Pt voiced understanding.    Plan:  x Continue above changes  x Follow up in approx 1 month   Pt has completed medically supervised wt loss program x  mths  x Goals:   1. Reduce starchy CHO and starchy vegetable intake to ½ c cooked per meal.  2. Increase non-starchy vegetable intake to 2 c cooked per meal.  3. Daily practice of bariatric appropriate eating behaviors.   4. Increase water intake to goal and reduce sugar rich beverages.   5. Test protein shakes discussed  6. Utilize dining out guide when out.    Estimated Daily Needs:  6164-8814 Kcals/d 20-25 kcals/kg goal wt  71-78 g protein/d 1.0-1.1 g/kg goal wt  73 fl oz/d  73 fl oz/d female     100 fl oz/d male        Signature of Registered Dietitian/Nutritionist: Tanika Feliciano RDN, LDN

## 2022-04-30 NOTE — PROGRESS NOTES
[facility]  Weight Loss Surgery Department  [address]  [place], [place]  [phone] Office  [phone] Fax    PreOp: Medically Supervised Weight Loss Counseling    Name: Monique Acevedo : 55   MRN: [id number]   Date: 18  Visit #:      Todays Wt: 296#  Previous Wt: 301#   Initial Wt: 301#        Notes from Dietitian/Nutritionist Visit:  Pt states she has incorporated breakfast more routinely since initial consultation last month.  She has increased her intake of fruit and protein at each meal as advised.  She is chewing more thoroughly and  her beverages from meals.  However, pt states she needs additional practice with this.  She denies soft drink intake.    Pt was advised to more precisely measure portions, specifically starches and starchy vegetables.  In addition, pt was provided with recommendations for vegetable servings each day.  Pt voiced understanding.    Plan:  x Continue above changes  x Follow up in approx 1 month   Pt has completed medically supervised wt loss program x  mths  x Goals:   1. Reduce starchy CHO and starchy vegetable intake to ½ c cooked per meal.  2. Increase non-starchy vegetable intake to 2 c cooked per meal.  3. Daily practice of bariatric appropriate eating behaviors.     Estimated Daily Needs:  7325-2908 Kcals/d 20-25 kcals/kg goal wt  71-78 g protein/d 1.0-1.1 g/kg goal wt  73 fl oz/d  73 fl oz/d female     100 fl oz/d male        Signature of Registered Dietitian/Nutritionist: Tanika Feliciano RDN, LDN

## 2022-04-30 NOTE — H&P
REASON FOR ADMISSION:  Chest pain.    HISTORY OF PRESENT ILLNESS:  Ms. Acevedo is a 65-year-old, obese  female, well known to myself with a known history of mild coronary artery disease noted on the coronary angiogram from January of 2021.  She also has a dual-chamber permanent pacemaker for her history of cardiac arrhythmias, history of gastroesophageal reflux disease, and a recent cervical spine surgery within the last 6 to 12 months, for which she still wears a soft collar.  She started with severe substernal chest discomfort yesterday.  The symptoms were nonexertional, initially localized, and then eventually began to radiate to the left upper extremity.  She did have some associated shortness of breath, and mild diaphoresis.  She had some relief with at least 2 sublingual nitroglycerine, and because of the persistent nature of her symptoms, she presented to the emergency department for further evaluation and management.  She had been suffering with the chest pain for several hours.  At the moment, she is resting comfortably in bed, eating breakfast.  She adds that the chest discomfort is improving.    PAST MEDICAL AND PAST SURGICAL HISTORY:  As outlined above, and again significant for history of asthma, obesity, coronary artery disease, hypertension, gastroesophageal reflux disease, she has a [employer] dual-chamber permanent pacemaker, obstructive sleep apnea syndrome, ulcerative colitis, irritable bowel syndrome, asthma, type 2 diabetes mellitus, arthritis, anxiety, depression.  She has a history of a hiatal hernia, panic attacks, history of gastroparesis, and ischemic colitis.  She has undergone adenoid surgery, arthroscopic shoulder surgery, cervical laminectomy, tonsillectomy, hysterectomy, removal of a salivary gland, nasal septoplasty, and right knee replacement.    ALLERGIES:  Multiple medication allergies as outlined in the medical record, which includes Asacol, Benicar,  Diovan, erythromycin, Hismanal, Motrin, Pamelor, Plendil, Saphris, Vioxx, and Zetia.    MEDICATIONS:  As outlined in the medical record and includes:  1. Aspirin daily.  2. Protonix daily, recently increased to twice daily.    3. Sublingual nitroglycerin p.r.n.    FAMILY HISTORY:  Noncontributory.    SOCIAL HISTORY:  Negative for alcohol, tobacco, or illicit drug abuse.    REVIEW OF SYSTEMS:  As per History of Present Illness.    PHYSICAL EXAMINATION:  GENERAL:  Showing a pleasant, cooperative, awake, alert, oriented, and obese 65-year-old  female resting comfortably in bed at the moment, and in no apparent distress.     VITAL SIGNS:  On admission showing a blood pressure of 127/78, heart rate is 71 beats per minute, respiratory rate is 21 breaths per minute, oxygen saturation 99% on room air, she weighs 104.5 kg.   HEENT:  Grossly unremarkable.   NECK:  Supple and obese.  She has soft collar.  She has soft bilateral carotid bruits.  No jugular venous distention with the patient resting in a semi-lordotic position in bed.     CARDIAC:  Showing a regular rhythm.  Pacemaker noted in the left infraclavicular space.   LUNGS:  Fields clear to auscultation bilaterally anteriorly.   ABDOMEN:  Obese, otherwise, soft and benign.   EXTREMITIES:  Show no significant clubbing, cyanosis, or edema.    LABORATORY DATA:  Telemetry showing a stable sinus rhythm with occasional atrial pacing at 71 beats per minute.  CBC, chemistries, and cardiac enzymes so far are unremarkable.    IMPRESSION:    1. Atypical angina.  2. History of mild coronary artery disease.  3. Gastroesophageal reflux disease.  4. History of irritable bowel syndrome.  5. Ulcerative colitis.  6. Dual-chamber permanent pacemaker [employer] dual-chamber permanent pacemaker.  7. Hypertension.  8. Type 2 diabetes mellitus.  9. Dyslipidemia.    RECOMMENDATIONS:  I will arrange for the patient undergo pacemaker interrogation.  We will continue with serial  cardiac enzymes to further rule out any signs of acute coronary syndrome, along with serial echocardiograms.  We will continue to monitor progress closely on telemetry.  Resume her current home medications.  I am also arranging for a GI consultation with Dr. Mcgovern, who is her gastroenterologist.  Apparently, he recently increased her Protonix to 40 mg twice daily a couple of days     ago.  If she rules out for acute coronary syndrome she may need further gastroenterologic evaluation for her presenting symptoms.  Further recommendations forthcoming.        ______________________________  Link Macario DO    CT/UG  DD:  03/04/2021  Time:  10:05AM  DT:  03/04/2021  Time:  10:47AM  Job #:  [id number]    The H&P was reviewed, the patient was examined, and the following changes to the patients condition are noted:  ______________________________________________________________________________  ______________________________________________________________________________  ______________________________________________________________________________  [  ] No changes to the patient's condition:      ______________________________                                             ___________________  PHYSICIAN SIGNATURE                                                             DATE/TIME

## 2022-04-30 NOTE — OP NOTE
DATE OF SURGERY:    09/28/2018    SURGEON:  Say Tejada MD    PROCEDURE PERFORMED:  Esophagogastroduodenoscopy.    INDICATIONS:  This is a 63-year-old female who is being worked up for bariatric surgery.  She has a history of mild reflux disease.  The intention was to rule out hiatal hernia or intragastric or esophageal abnormalities.    PREOPERATIVE DIAGNOSES:    1. Morbid obesity.  2. Gastroesophageal reflux disease.  3. Hypertension.  4. Diabetes type 2.    POSTOPERATIVE DIAGNOSES:    1. Morbid obesity.  2. Gastroesophageal reflux disease.  3. Hypertension.  4. Diabetes type 2.  5. Hiatal hernia.    ANESTHESIA:  Monitored anesthesia.    SPECIMENS:  None.    COMPLICATIONS:  None.    FINDINGS:  Normal esophageal mucosa, normal gastric mucosa, normal duodenal mucosa, but there is a 2.5 cm hiatal hernia.    PROCEDURE IN DETAIL:  Patient was brought to the operating room, laid in left lateral decubitus position.  When adequate anesthesia was achieved, the enteroscope was advanced in her mouth, through her esophagus, and into her stomach.  There was normal gastric mucosa.  We then intubated the duodenum where normal duodenal mucosa was observed.  The scope was pulled back, and upon retroflexion there was     a 2.5 to 3 cm hiatal hernia and there were no intragastric lesions.  No esophageal lesions as well.  Patient tolerated procedure well, was brought to her postoperative room in stable, satisfactory condition.        ______________________________  Say Tejada MD RA/YESSY  DD:  09/28/2018  Time:  11:59AM  DT:  09/28/2018  Time:  12:41PM  Job #:  [id number]

## 2022-04-30 NOTE — PROGRESS NOTES
[facility]  Weight Loss Surgery Department  [address]  [place], [place]  [phone] Office  [phone] Fax    PreOp: Medically Supervised Weight Loss Counseling    Name: Monique Acevedo : 55   MRN: [id number]   Date: 18  Visit #:      Todays Wt: 292#  Previous Wt: 296#   Initial Wt: 301#        Notes from Dietitian/Nutritionist Visit:  Pt reports difficulty sleeping and waking late in the day which makes her breakfast occur at noon.  This typically is pears or peaches and cottage cheese.  Pt reports fast food and restaurant eating in the last week.  Pt does report smaller portions and greater satiety.  She also states that when she eats at home she will measure the starchy CHO at the meal to ½ c as recommended last month.  Pt also states she is counting her chews to be sure she doesnt eat too fast.  Pt was advised to reduce dining out to once per week and to avoid fried foods.  In addition, pt was advised to reduce sugar rich beverages (Arnold Palmers) and to increase water intake.  Pt stated understanding.    Plan:  x Continue above changes  x Follow up in approx 1 month   Pt has completed medically supervised wt loss program x  mths  x Goals:   1. Reduce starchy CHO and starchy vegetable intake to ½ c cooked per meal.  2. Increase non-starchy vegetable intake to 2 c cooked per meal.  3. Daily practice of bariatric appropriate eating behaviors.   4. Increase water intake to goal and reduce sugar rich beverages.     Estimated Daily Needs:  5061-0086 Kcals/d 20-25 kcals/kg goal wt  71-78 g protein/d 1.0-1.1 g/kg goal wt  73 fl oz/d  73 fl oz/d female     100 fl oz/d male        Signature of Registered Dietitian/Nutritionist: Tanika Feliciano RDN, LDN

## 2022-04-30 NOTE — OP NOTE
Patient:   Monique Acevedo            MRN: [id number]            FIN: [id number]               Age:   65 years     Sex:  Female     :  1955   Associated Diagnoses:   Cholelithiasis; Biliary dyskinesia   Author:   Say Tejada MD      Operative Note   Operative Information   Date/ Time:  2021 14:05:00.     Procedures Performed: laparoscopic cholecystectomy.     Indications: ruq pain, nausea, gallbladder ejection fraction elevated, consistent with hyperkinetic gallbladder.     Preoperative Diagnosis: Cholelithiasis (HLV99-PA K80.20), Biliary dyskinesia (ZIA43-TV K82.8).     Postoperative Diagnosis: Cholelithiasis (YVR67-KE K80.20), Biliary dyskinesia (YOM25-CT K82.8).     Surgeon: Say Tejada MD.     Anesthesia: geta.     Speciman Removed: gallbladder.     Description of Procedure/Findings/    Complications: pt was brought to OR    Laid supine on OR table and intubated endotracheally     was prepped and draped in the usual sterile fashion.    an 11mm visiport was placed just right and lateral to the umbilicus    there was no injury to underlying structures or bleeding    pneumoperitoneum was achieved.    an 11 mm port was placed in the epigastrum    5mm ports were placed in the mid clavicular line and anterior axillary line at the costal margin or 2 finger breadths below.     these ports were placed under direct vision with no injury to abdominal structures    attention turned to gallbladder,    the gallbladder was grasped and retracted cephalad, the gallbladder was then grasped at the neck and the periotoneal attachments were pulled down using blunt and sharp dissection and electrocautery.    the cystic duct and artery were freed of thier fatty and peritoneal attachements untill each was seen clearly entering the gallbladder with no intervening structures within this window of safety.    the cystic duct was clipped with 3 5mm clips and cut, the same was done for the cystic  artery    electrocautery was used to free the gallbladder from the gallbladder fossa of the liver,     the gallbladder fossa was cheched for bleeding or bile leak, there was none    the gallbladder was retrieved using a toothed grasper through the epigastric port    the skin was closed with 4-0 vicryl sutures and dermabond skin glue    patient was extubated and taken to pacu in stable satisfactory condition.     .     Esimated blood loss: No blood loss.     Findings: minimally inflammed gallbladder.     Complications: None.

## 2022-04-30 NOTE — DISCHARGE SUMMARY
DISCHARGE DATE:  03/05/2021    HOSPITAL COURSE:  Mrs. Acevedo was admitted for evaluation and management of signs and symptoms suggestive of atypical angina.  She did have a known history of stable coronary artery disease based on a coronary angiogram approximately 1 year ago.  She has been maintained on medical therapies.  Her evaluation showed no signs of acute coronary syndrome based on negative cardiac enzymes, and stable electrocardiograms.  She continues to have epigastric and substernal chest discomfort, and in light of her history of the above GI issues, her gastroenterologist, Dr. Mcgovern, was consulted to evaluate and treat the patient.  She did undergo upper endoscopy earlier this morning, and found to have grade A reflux esophagitis.  There was no bleeding gastric ulcers with no other stigmata for bleeding noted.  There was evidence of gastritis, and evidence of a sleeve gastrectomy was also noted.  The rest of the evaluation was unremarkable, and biopsies were taken.  The postoperative course, in that regard, so far appears to be unremarkable.  She did undergo interrogation of her [employer] dual chamber permanent pacemaker while as an inpatient, and the device was found to be functioning normally as well.  She is pacing 97% of the time.  For the most part today, she is feeling back to normal.  She is awake, alert, and comfortable, offering no problems.    PHYSICAL EXAMINATION:  VITAL SIGNS:  Stable, showing a blood pressure of 142/79, heart rate 71 beats per minute.  She is afebrile.  Oxygen saturation 95% on room air.     HEART:  Regular heart tones.     LUNGS:  Clear lung fields.     ABDOMEN:  Obese, but otherwise soft and benign.     EXTREMITIES:  Showing no significant clubbing, cyanosis, or edema.    LABORATORY DATA:  Telemetry showing sinus rhythm with occasional atrial pacing, heart rate in the 70s.  Her laboratory data yesterday showed no elevation of cardiac  enzymes.    IMPRESSION:    1. Atypical chest discomfort.  Does not appear to be of cardiac etiology.  2. Stable mild coronary artery disease.  3. [employer] dual-chamber permanent pacemaker, for sick sinus syndrome.    4. Esophagitis/gastritis.  5. Obesity.    6. History of gastrectomy.  7. Anxiety and depression.    RECOMMENDATIONS:  Mrs. Acevedo will be arranged for discharge home today.  She will continue on all the same medications as on admission.  We are arranging a followup evaluation with her gastroenterologist, Dr. Mcgovern, to continue to monitor and evaluate her GI symptoms.  Will continue to monitor her progress closely in my office from a cardiovascular standpoint.  I will continue to titrate her medical therapies as tolerated, and this includes monitoring her pacemaker function.  She will be discharged home on the same medications, and in addition, on a short course of Xanax to take as-needed for anxiety, low-dose Norco to take as-needed for discomfort, and increasing her Protonix to 40 mg twice daily in light of the GI issues, and as per recommendations of her gastroenterologist.  I have asked her to call or return should she have any further problems or complaints.  Further recommendations forthcoming.        ______________________________  Link Macario DO    CT/UL  DD:  03/05/2021  Time:  02:38PM  DT:  03/05/2021  Time:  03:03PM  Job #:  [id number]    cc: Link Macario DO

## 2022-04-30 NOTE — DISCHARGE SUMMARY
DISCHARGE DATE:  06/12/2020    HOSPITAL COURSE:  Mrs. Acevedo was admitted for elective transesophageal echocardiography procedure to further evaluate suspicious findings and symptoms noted during her noninvasive cardiovascular evaluation.  As outlined in operative report, she underwent uneventful transesophageal echocardiography procedure today.  She tolerated procedure well and the postoperative course being monitored closely in preparation for discharge home later today.    DISCHARGE DIAGNOSES:    1. Mild valvular heart disease.  2. Cardiac arrhythmias and a history of syncope.  3. Status post dual-chamber permanent pacemaker.  4. Coronary artery disease.  5. Type 2 diabetes mellitus.  6. Hypertension.  7. Dyslipidemia.  8. Sleep apnea syndrome.    RECOMMENDATIONS:  Mrs. Acevedo will be arranged for discharge home to continue on same medications as on admission.  I have asked her to call or return should she have any further problems or complaints.  I will be checking on her progress in my office in the very near future.  We will continue to monitor her progress closely and titrate medical therapies as tolerated.  Further recommendations forthcoming.        ______________________________  Link Macario DO    CT/UB  DD:  06/12/2020  Time:  10:43AM  DT:  06/12/2020  Time:  11:04AM  Job #:  [id number]    cc: Link Macario DO

## 2022-04-30 NOTE — PROGRESS NOTES
[facility]  Weight Loss Surgery Department  [address]  [place], [place]  [phone] Office  [phone] Fax    PreOp: Medically Supervised Weight Loss Counseling    Name: Monique Acevedo : 55   MRN: [id number]   Date: 10/24/18  Visit #:      Todays Wt: 292#  Previous Wt: 293#   Initial Wt: 301#        Notes from Dietitian/Nutritionist Visit:  Pt has noticed her emotional connection to eating this month.  She recognized this during a time of anxiety and anger.  She has never recognized this connection prior to becoming more mindful of eating behaviors.  She does admit to splurging as a result, but states that she connected immediately.  Pt was advised to increase awareness before she acts on emotional impulse and was given strategies to assist. Pt was able to test protein shakes as discussed and has purchased products for the post-op full liquid diet.   Pt verbalized understanding.      Plan:  x Continue above changes   Follow up in approx 1 month  x Pt has completed medically supervised wt loss program x  mths  x Goals:   1. Reduce starchy CHO and starchy vegetable intake to ½ c cooked per meal.  2. Increase non-starchy vegetable intake to 2 c cooked per meal.  3. Daily practice of bariatric appropriate eating behaviors.   4. Increase water intake to goal and reduce sugar rich beverages.   5. Test protein shakes discussed  6. Utilize dining out guide when out.    Estimated Daily Needs:  6161-1378 Kcals/d 20-25 kcals/kg goal wt  71-78 g protein/d 1.0-1.1 g/kg goal wt  73 fl oz/d  73 fl oz/d female     100 fl oz/d male        Signature of Registered Dietitian/Nutritionist: Tanika Feliciano, PATRICIAN, LDN

## 2022-05-05 NOTE — HISTORICAL [facility] CERNER
This is a historical note converted from [employer]. Formatting and pictures may have been removed.  Please reference V[employer]leta for original formatting and attached multimedia. Chief Complaint  c/o midsternal chest pain radiating into left arm causing SOB. started at 9pm earlier tonight. tried Nitro SL, pain got worse. took another SL around 0215. states pain eased slightly but came back. pacemaker placed 1yr ago  Reason for Consultation  chest pain  History of Present Illness  ??Ms. Acevedo is a 64 y/o female known to Dr. Mcgovern who has a past medical history of ischemic colitis, HTN, depression, sleep apnea, gastroparesis, and status post gastric sleeve in 2018.  ???  ??Patient reported to the ED on 3/4/2021 with complaints of worsening mid chest pain that radiated to her left arm that slowly worsened throughout the?day then night.? Nitroglycerin?did improve her pain. ?On arrival here,?she was hemodynamically stable and afebrile.? Labs?were remarkable for elevated BUN, but otherwise grossly unremarkable with?negative troponin.? CTA of the chest showed no PE, but did show esophageal thickening.? Given low suspicion for?cardiogenic?pain, GI was consulted.? Patient was just seen by Dr. Mcgovern on Tuesday, 3/2/2021.  She reports having a neck fusion due to muscle weakness 4 months ago for C4-5-6 refused. ?Since the surgery, she has been having more dysphagia and subsequent vomiting of food including oat meals, salads, and potatoes. ?She is now having issues with solids AND liquids. ?She was told to increase her Protonix to twice a day. ?We had plans to perform EGD with dilation if increasing Protonix did not resolve her issues. ?Currently, she is asking for a EGD while inpatient while I am here.. ?She is very distraught about this past year and her recurrent admissions for chest pain.?  ?  EGD 1/20 showed erosive esophagitis. ?Colonoscopy in 2017 showed resolving ischemic colitis. ?She is due for colonoscopy in  2022.?  Review of Systems  Constitutional:?no fevers, chills,?fatigue, weakness.  Eye:?no vision loss, eye redness, drainage, or pain.  ENMT:?No hearing problems.  Respiratory:? no wheezing, + shortness of breath, no?cough.  Cardiovascular:?+chest pain  Gastrointestinal:?see HPI.  Genitourinary:?no dysuria, no urinary frequency or urgency, no hematuria.  Hema/Lymph:?no abnormal bruising or bleeding.  Musculoskeletal:?no back, muscle or joint pain, no joint swelling.  Integumentary:?no skin rash or abnormal lesion.  Neurologic: no headache, no dizziness, no weakness or numbness, no confusion. + depression  Physical Exam  Vitals & Measurements  T:?36.8? ?C (Oral)? TMIN:?36.6? ?C (Oral)? TMAX:?36.8? ?C (Oral)? HR:?70(Monitored)? RR:?18? BP:?132/77? SpO2:?98%? WT:?104.5?kg? BMI:?39.63?  General:?obese, well-developed well-nourished, no acute distress.  Eye: EOMI, clear conjunctiva.  HENT:?normocephalic, atraumatic. moist oral mucosa.  Respiratory:?non-labored.  Cardiovascular:?regular rate and rhythm. no edema.  Gastrointestinal:?soft, non-tender, non-distended with normal bowel sounds, no masses to palpation.  Musculoskeletal:?normal range of motion.  Integumentary: warm, dry, pink.  Neurologic: A&Ox3.  Psychiatric: Cooperative.? Appropriate mood and affect. anxious. crying. depressed  Assessment/Plan  Chest pain?[id number]  Chest pain?R07.9  66 y/o female known to Dr. Mcgovern who has a past medical history of ischemic colitis, HTN, depression, sleep apnea, gastroparesis, and status post gastric sleeve in 2018. ?Presented with chest pain and shortness of breath.? She is very distraught?that she was readmitted for the same issue. ?She wonders why?nitroglycerin relieved her pain?if this is a GI issue.? That is a good question.  ?   1. Dysphagia  2. Chest pain  Negative troponin  CTA showed esophageal thickening  - Patient is asking for an EGD to eval her pain and dysphagia. Will schedule for  tomorrow  - PPI BID  - Carafate QID  - Diet as tolerated today, then NPO after MN   Problem List/Past Medical History  Ongoing  Anxiety  Arthritis  CAD - Coronary artery disease  Depression  Diabetes mellitus type 2  GERD - Gastro-esophageal reflux disease  H/O: asthma  Hiatal hernia  HTN - Hypertension  IBS - Irritable bowel syndrome  Obesity  Pacemaker rhythm  Panic attack  Sick sinus syndrome  Sleep apnea  SOB - Shortness of breath  UC - Ulcerative colitis  Weakness  Historical  Angina  Anxiety disorder  Bradycardia  Gastroparesis  HTN - Hypertension  Ischemic colitis  Major depression  Migraine  Sleep apnea  Procedure/Surgical History  Echocardiography, transesophageal, real-time with image documentation (2D) (with or without M-mode recording); including probe placement, image acquisition, interpretation and report (06/12/2020)  Transesophageal Echo (for Surgery) (., None) (06/12/2020)  Ultrasonography of Heart with Aorta, Transesophageal (06/12/2020)  Insertion of Pacemaker Lead into Right Atrium, Percutaneous Approach (01/28/2020)  Insertion of Pacemaker Lead into Right Ventricle, Percutaneous Approach (01/28/2020)  Insertion of Pacemaker, Dual Chamber into Chest Subcutaneous Tissue and Fascia, Open Approach (01/28/2020)  Biopsy Gastrointestinal (01/27/2020)  Esophagogastroduodenoscopy (01/27/2020)  Excision of Stomach, Via Natural or Artificial Opening Endoscopic, Diagnostic (01/27/2020)  Fluoroscopy of Aorta and Bilateral Lower Extremity Arteries (01/25/2020)  Fluoroscopy of Left Heart using Low Osmolar Contrast (01/25/2020)  Fluoroscopy of Multiple Coronary Arteries using Low Osmolar Contrast (01/25/2020)  Measurement of Cardiac Sampling and Pressure, Left Heart, Percutaneous Approach (01/25/2020)  Excision of Stomach, Percutaneous Endoscopic Approach, Vertical (12/14/2018)  Gastric Sleeve Laparoscopic (None) (12/14/2018)  Lap Hiatal Hernia (12/14/2018)  Repair Diaphragm, Percutaneous Endoscopic Approach  (12/14/2018)  Esophagogastroduodenoscopy (09/28/2018)  Esophagogastroduodenoscopy, flexible, transoral; diagnostic, including collection of specimen(s) by brushing or washing, when performed (separate procedure) (09/28/2018)  Inspection of Upper Intestinal Tract, Via Natural or Artificial Opening Endoscopic (09/28/2018)  adenoidectomy  appendectomy  cervical laminectomy  colonoscopy  hysterectomy  nasal septoplasty  ORIF  pacemakr  removal of salivary gland  right knee replacement  shoulder arthroscopic  tonsillectomy   Medications  Inpatient  aspirin, 325 mg= 1 tab(s), Oral, Daily  hydrALAZINE, 10 mg= 0.5 mL, IV Push, q2hr, PRN  labetalol, 10 mg= 2 mL, IV Push, q1hr, PRN  Maalox Antacid with Anti-Gas, 30 mL, Oral, q4hr, PRN  Milk of Magnesia, 30 mL, Oral, Daily, PRN  nitroglycerin, 0.4 mg= 1 tab(s), SL, q5min, PRN  nitroglycerin 2% transdermal ointment, 1 in, TOP, [id number]  nitroglycerin 2% transdermal ointment, 0.5 in, TOP, [id number]  Norco 5 mg-325 mg oral tablet, 1 tab(s), Oral, q4hr, PRN  Norco 5 mg-325 mg oral tablet, 2 tab(s), Oral, q4hr, PRN  Phenergan, 12.5 mg= 0.5 mL, IM, q6hr, PRN  Senokot S, 1 tab(s), Oral, BID, PRN  Tylenol, 1000 mg= 2 tab(s), Oral, q6hr, PRN  Tylenol, 650 mg= 1 supp, AZ (rectal), q6hr, PRN  Xanax, 0.25 mg= 1 tab(s), Oral, q6hr, PRN  Xylocaine Jelly 2% topical gel with applicator, 5 mL, TOP, Once, PRN  Zofran, 4 mg= 2 mL, IV, q8hr, PRN  Home  Advair Diskus 100 mcg-50 mcg inhalation powder, 2 inh, INH, BID  Alpha Lipoic 300 mg oral tablet, 300 mg= 1 tab(s), Oral, Daily  Alpha Lipoic 300 mg oral tablet, 300 mg= 1 tab(s), Oral, Daily  aspirin 81 mg oral tablet, 81 mg= 1 tab(s), Oral, Daily  atropine-diphenoxylate 0.025 mg-2.5 mg oral tablet, 1 tab(s), Oral, QID, PRN  azithromycin 250 mg oral tablet, 250 mg= 1 tab(s), Oral, Daily,? ?Not taking  B-50 Complex oral capsule, 1 tab, Oral, Daily  busPIRone 5 mg oral tablet, 0.5 mg, Oral, TID  Centrum Adults, 1, Oral, BID  CITALOPRAM TAB 20MG,  20 mg= 1 tab(s), Oral, qPM  citalopram 40 mg oral tablet, 40 mg= 1 tab(s), Oral, At Bedtime  CLORAZ DIPOT TAB 15MG, 3.75 mg= 0.25 tab(s), Oral, qPM  clorazepate 7.5 mg oral tablet, 7.5 mg= 1 tab(s), Oral, At Bedtime  dicyclomine, 1 tab(s), Oral, QID, PRN  dicyclomine 10 mg oral tablet, 1 tab, Oral, QID, PRN  diphenoxylate, 1 tab(s), Oral, q4hr, PRN  levothyroxine 88 mcg (0.088 mg) oral tablet, 88 mcg= 1 tab(s), Oral, Daily  nitroglycerin 0.4 mg sublingual TAB, 0.4 mg= 1 tab(s), SL, q5min, PRN  nitroglycerin 0.4 mg sublingual TAB, 0.4 mg= 1 tab(s), SL, q5min, PRN  Omega Essentials, 1 cap(s), Oral, BID  Os-Frantz Calcium+D3, 2 tab(s), Oral, BID  Pravachol, Oral, Daily,? ?Not taking: duplicate  pravastatin 40 mg oral tablet, 40 mg= 1 tab(s), Oral, Daily  Prevalite Packets, 1 PACK, BID, PRN  ProAir HFA 90 mcg/inh inhalation aerosol with adapter, 2 puff(s), INH, q4hr, PRN  Protonix 40 mg ORAL enteric coated tablet, 40 mg= 1 tab(s), Oral, Daily, 5 refills  Ranexa 500mg Tab extended release, 500 mg= 1 tab(s), Oral, BID  ROPINIROLE TAB 1MG, 1 mg= 1 tab(s), Oral, qPM  Template Non-Formulary Med  Template Non-Formulary Med, 1 tab(s), Oral, qWeek  Template Non-Formulary Med, 2 tab(s), Oral  triamcinolone 0.1% topical cream, 1 darling, TOP, TID  valsartan 40 mg oral tablet, 40 mg= 1 tab(s), Oral, Daily  valsartan 40 mg oral tablet, 40 mg= 1 tab(s), Oral, Daily  Vitamin B12 2500 mcg sublingual tablet, 2500 mcg= 1 tab(s), SL, Daily  Vitamin D3 50,000 intl units (1250 mcg) oral capsule, 74038 IntUnit= 1 cap(s), Oral, qWeek  Allergies  Asacol?(rash, SOB, .)  Benicar?(weakness)  Diovan?(itching, rash)  Hismanal?(unknown)  Motrin?(GI upset)  Pamelor?(depression)  Plendil?(unknown)  Saphris?(unknown)  Vioxx?(GI upset)  Zetia?(weakness)  erythromycin?(GI upset)  Social History  Abuse/Neglect  No, 03/04/2021  No, 06/12/2020  Alcohol  Current, 1-2 times per year, 09/21/2018  Substance Use  Tobacco  Never (less than 100 in lifetime), N/A,  03/04/2021  Never (less than 100 in lifetime), N/A, 06/12/2020  Family History  Diabetes mellitus type 2: Mother.  Esophageal cancer: Father.  Heart disease.....: Mother and Father.  Hypertension.: Mother and Father.  Skin cancer......: Father.  Lab Results  Labs Last 72 Hours?  ?Chemistry? Hematology/Coagulation?   Sodium Lvl: 142 mmol/L (03/04/21 04:33:00) WBC: 7.6 x10(3)/mcL (03/04/21 04:33:00)   Potassium Lvl: 4.6 mmol/L (03/04/21 04:33:00) RBC: 4.57 x10(6)/mcL (03/04/21 04:33:00)   Chloride:?108 mmol/L?High (03/04/21 04:33:00) Hgb: 13.8 gm/dL (03/04/21 04:33:00)   CO2: 25 mmol/L (03/04/21 04:33:00) Hct: 42.9 % (03/04/21 04:33:00)   Calcium Lvl: 8.7 mg/dL (03/04/21 04:33:00) Platelet: 264 x10(3)/mcL (03/04/21 04:33:00)   Glucose Lvl: 113 mg/dL (03/04/21 04:33:00) MCV: 93.9 fL (03/04/21 04:33:00)   BUN:?22.6 mg/dL?High (03/04/21 04:33:00) MCH: 30.2 pg (03/04/21 04:33:00)   Creatinine: 0.86 mg/dL (03/04/21 04:33:00) MCHC:?32.2 gm/dL?Low (03/04/21 04:33:00)   Est Creat Clearance Ser: 58.59 mL/min (03/04/21 05:41:53) RDW: 12.9 % (03/04/21 04:33:00)   eGFR-AA: >60 (03/04/21 04:33:00) MPV: 10.7 fL (03/04/21 04:33:00)   eGFR-ANGEL: >60 (03/04/21 04:33:00) Abs Neut: 4.3 x10(3)/mcL (03/04/21 04:33:00)   Bili Total: 0.5 mg/dL (03/04/21 04:33:00) Neutro Auto: 56 % (03/04/21 04:33:00)   Bili Direct: 0.1 mg/dL (03/04/21 04:33:00) Lymph Auto: 31 % (03/04/21 04:33:00)   Bili Indirect: 0.4 mg/dL (03/04/21 04:33:00) Mono Auto: 8 % (03/04/21 04:33:00)   AST: 24 unit/L (03/04/21 04:33:00) Eos Auto: 3 % (03/04/21 04:33:00)   ALT: 19 unit/L (03/04/21 04:33:00) Abs Eos: 0.2 x10(3)/mcL (03/04/21 04:33:00)   Alk Phos: 110 unit/L (03/04/21 04:33:00) Basophil Auto: 0 % (03/04/21 04:33:00)   Total Protein: 7.3 gm/dL (03/04/21 04:33:00) Abs Neutro: 4.3 x10(3)/mcL (03/04/21 04:33:00)   Albumin Lvl: 3.8 gm/dL (03/04/21 04:33:00) Abs Lymph: 2.4 x10(3)/mcL (03/04/21 04:33:00)   Globulin: 3.5 gm/dL (03/04/21 04:33:00) Abs Mono: 0.6 x10(3)/mcL  (03/04/21 04:33:00)   A/G Ratio: 1.1 ratio (03/04/21 04:33:00) Abs Baso: 0 x10(3)/mcL (03/04/21 04:33:00)   Lipase Lvl: 35 U/L (03/04/21 04:33:00)    Troponin-I: <0.010 (03/04/21 04:33:00)    Diagnostic Results  (03/04/2021 06:49 CST CT Angio Chest PE W Contrast)  * Final Report *  ?  Reason For Exam  Pulmonary Embolism  ?  Radiology Report  ?  History.  Chest pain.  ?  Reference.  None available.  ?  Technique.  Helical acquisition through the chest with IV contrast targeting the  pulmonary arteries. Multiplanar and 3D MIP reconstructed images were  provided for review.  mGycm. Automatic exposure control,  adjustment of mA/kV or iterative reconstruction technique was used to  reduce radiation.  ?  Findings.  There is good opacification of the pulmonary arterial tree. There is  no evidence of pulmonary thromboembolism. No aortic dissection.?  ?  There is no mediastinal, hilar or axillary lymphadenopathy by size  criteria. Heart is normal in size. Aorta and pulmonary artery are  normal in caliber. No pericardial effusion.  ?  No pleural effusion. Lungs are clear.?  ?  There is esophageal wall thickening. There are postsurgical changes of  the stomach. There are degenerative changes of the spine with ACDF.  ?  Impression.  1. Negative for pulmonary thromboembolic disease. No acute findings in  the chest.?  2. Esophageal wall thickening, question esophagitis.  ?  ?  ?  ?  ?  ?  ?  Signature Line  Electronically Signed By: Louis Gill MD  Date/Time Signed: 03/04/2021 07:00 [1] (03/04/2021 05:18 CST XR Chest 1 View)  * Final Report *  ?  Reason For Exam  Chest Pain  ?  Radiology Report  History:  Chest pain  ?  Comparison:  29 January 2020  ?  Findings:  Portable frontal view of the chest was obtained. Stable left-sided  pacing device. The heart is not enlarged. Grossly clear lungs. No  pneumothorax. ACDF is noted.  ?  Impression:?  No acute findings.  ?  Signature Line  Electronically Signed By: Jairo  Louis KLEIN  Date/Time Signed: 03/04/2021 08:15 [2]     [1]?CT Angio Chest PE W Contrast; Louis Gill MD 03/04/2021 06:49 CST  [2]?XR Chest 1 View; Louis Gill MD 03/04/2021 05:18 CST   Seen and examined. ?Agree with above assessment and plan as per physician assistant.? We will proceed with EGD tomorrow but some of her dysphagia may be related to her recent neck surgery.

## 2023-11-21 ENCOUNTER — TELEPHONE (OUTPATIENT)
Dept: SURGERY | Facility: CLINIC | Age: 68
End: 2023-11-21
Payer: MEDICARE

## 2023-11-21 DIAGNOSIS — E11.9 TYPE 2 DIABETES MELLITUS WITHOUT COMPLICATION, UNSPECIFIED WHETHER LONG TERM INSULIN USE: ICD-10-CM

## 2023-11-21 DIAGNOSIS — Z13.0 SCREENING, ANEMIA, DEFICIENCY, IRON: ICD-10-CM

## 2023-11-21 DIAGNOSIS — Z91.89 ELECTROLYTE IMBALANCE RISK: ICD-10-CM

## 2023-11-21 DIAGNOSIS — Z13.21 ENCOUNTER FOR VITAMIN DEFICIENCY SCREENING: Primary | ICD-10-CM

## 2023-12-04 ENCOUNTER — TELEPHONE (OUTPATIENT)
Dept: SURGERY | Facility: CLINIC | Age: 68
End: 2023-12-04
Payer: MEDICARE

## 2023-12-13 ENCOUNTER — TELEPHONE (OUTPATIENT)
Dept: SURGERY | Facility: CLINIC | Age: 68
End: 2023-12-13
Payer: MEDICARE

## 2024-11-15 ENCOUNTER — TELEPHONE (OUTPATIENT)
Dept: SURGERY | Facility: CLINIC | Age: 69
End: 2024-11-15
Payer: MEDICARE